# Patient Record
Sex: FEMALE | URBAN - METROPOLITAN AREA
[De-identification: names, ages, dates, MRNs, and addresses within clinical notes are randomized per-mention and may not be internally consistent; named-entity substitution may affect disease eponyms.]

---

## 2023-10-06 ENCOUNTER — OFFICE VISIT (OUTPATIENT)
Age: 48
End: 2023-10-06
Payer: COMMERCIAL

## 2023-10-06 VITALS — DIASTOLIC BLOOD PRESSURE: 62 MMHG | WEIGHT: 139 LBS | SYSTOLIC BLOOD PRESSURE: 116 MMHG

## 2023-10-06 DIAGNOSIS — Z01.419 ENCOUNTER FOR GYNECOLOGICAL EXAMINATION (GENERAL) (ROUTINE) WITHOUT ABNORMAL FINDINGS: Primary | ICD-10-CM

## 2023-10-06 DIAGNOSIS — Z12.31 BREAST CANCER SCREENING BY MAMMOGRAM: ICD-10-CM

## 2023-10-06 PROCEDURE — 99386 PREV VISIT NEW AGE 40-64: CPT | Performed by: ADVANCED PRACTICE MIDWIFE

## 2023-10-06 NOTE — PROGRESS NOTES
doing well. Switching from [place] on recommendations from a friend    Mammo ordered- pt has had multiple breast biopsies and now has a clip- recommend she continue going to [address] [facility] in [facility] for comparisons of scans    Well woman exam:  Normal gynecologic and breast exams. Healthy habits and lifestyle reviewed. Pap with HPV performed today. Patient declines STD screening. Mammogram order placed.     SULMA Briscoe CNM

## 2023-10-12 LAB
CYTOLOGIST CVX/VAG CYTO: NORMAL
CYTOLOGY CVX/VAG DOC CYTO: NORMAL
CYTOLOGY CVX/VAG DOC THIN PREP: NORMAL
DX ICD CODE: NORMAL
HPV GENOTYPE REFLEX: NORMAL
HPV I/H RISK 4 DNA CVX QL PROBE+SIG AMP: NEGATIVE
Lab: NORMAL
OTHER STN SPEC: NORMAL
STAT OF ADQ CVX/VAG CYTO-IMP: NORMAL

## 2024-10-16 ENCOUNTER — OFFICE VISIT (OUTPATIENT)
Age: 49
End: 2024-10-16
Payer: COMMERCIAL

## 2024-10-16 VITALS
OXYGEN SATURATION: 98 % | DIASTOLIC BLOOD PRESSURE: 73 MMHG | HEART RATE: 62 BPM | WEIGHT: 142 LBS | SYSTOLIC BLOOD PRESSURE: 108 MMHG

## 2024-10-16 DIAGNOSIS — Z01.419 WELL WOMAN EXAM: Primary | ICD-10-CM

## 2024-10-16 PROCEDURE — 99396 PREV VISIT EST AGE 40-64: CPT | Performed by: ADVANCED PRACTICE MIDWIFE

## 2024-10-16 NOTE — PROGRESS NOTES
Annual exam    Leslee Bazan is a 49 y.o. female presents for an annual exam.  No concerns   Chief Complaint   Patient presents with    Annual Exam     LMP: 9/26/2024 (Estimated)  Her periods are moderate in flow and usually regular with a 26-32 day interval with 3-7 day duration.  She does not have dysmenorrhea.  Problems: no problems  Birth Control: none.  Last Pap: normal obtained 1 year(s) ago.  She does not have a history of GORDNO 2, 3 or cervical cancer.   Last Mammogram: had a recent mammogram 2/10/2024 which was negative for malignancy.  It was normal. Bi Rads 2  Last Bone Density: n/a  Last colonoscopy: 2021 fam hx of colon cancer.      History reviewed. No pertinent past medical history.    History reviewed. No pertinent surgical history.    Family History   Problem Relation Age of Onset    Colon Cancer Mother        Social History     Socioeconomic History    Marital status:      Spouse name: Not on file    Number of children: Not on file    Years of education: Not on file    Highest education level: Not on file   Occupational History    Not on file   Tobacco Use    Smoking status: Never    Smokeless tobacco: Never   Substance and Sexual Activity    Alcohol use: Yes    Drug use: Never    Sexual activity: Yes     Partners: Male   Other Topics Concern    Not on file   Social History Narrative    Not on file     Social Determinants of Health     Financial Resource Strain: Not on file   Food Insecurity: Not on file   Transportation Needs: Not on file   Physical Activity: Not on file   Stress: Not on file   Social Connections: Not on file   Intimate Partner Violence: Not on file   Housing Stability: Not on file       No current outpatient medications on file.     No current facility-administered medications for this visit.       No Known Allergies    Review of Systems - History obtained from the patient  Constitutional: negative for weight loss, fever, night sweats  HEENT: negative for hearing loss,

## 2025-03-31 DIAGNOSIS — Z12.31 SCREENING MAMMOGRAM FOR BREAST CANCER: Primary | ICD-10-CM
